# Patient Record
Sex: FEMALE | Race: WHITE | NOT HISPANIC OR LATINO | URBAN - METROPOLITAN AREA
[De-identification: names, ages, dates, MRNs, and addresses within clinical notes are randomized per-mention and may not be internally consistent; named-entity substitution may affect disease eponyms.]

---

## 2017-07-02 ENCOUNTER — EMERGENCY (EMERGENCY)
Facility: HOSPITAL | Age: 22
LOS: 1 days | Discharge: HOME | End: 2017-07-02
Attending: INTERNAL MEDICINE
Payer: COMMERCIAL

## 2017-07-02 ENCOUNTER — TRANSCRIPTION ENCOUNTER (OUTPATIENT)
Age: 22
End: 2017-07-02

## 2017-07-02 VITALS
DIASTOLIC BLOOD PRESSURE: 66 MMHG | TEMPERATURE: 97 F | HEART RATE: 111 BPM | RESPIRATION RATE: 18 BRPM | SYSTOLIC BLOOD PRESSURE: 115 MMHG | HEIGHT: 61 IN | OXYGEN SATURATION: 100 % | WEIGHT: 136.91 LBS

## 2017-07-02 VITALS
RESPIRATION RATE: 18 BRPM | HEART RATE: 108 BPM | DIASTOLIC BLOOD PRESSURE: 72 MMHG | TEMPERATURE: 98 F | SYSTOLIC BLOOD PRESSURE: 107 MMHG | OXYGEN SATURATION: 98 %

## 2017-07-02 PROCEDURE — 96375 TX/PRO/DX INJ NEW DRUG ADDON: CPT

## 2017-07-02 PROCEDURE — 99284 EMERGENCY DEPT VISIT MOD MDM: CPT | Mod: 25

## 2017-07-02 PROCEDURE — 99284 EMERGENCY DEPT VISIT MOD MDM: CPT

## 2017-07-02 PROCEDURE — 96374 THER/PROPH/DIAG INJ IV PUSH: CPT

## 2017-07-02 RX ORDER — SODIUM CHLORIDE 9 MG/ML
1000 INJECTION INTRAMUSCULAR; INTRAVENOUS; SUBCUTANEOUS ONCE
Qty: 0 | Refills: 0 | Status: COMPLETED | OUTPATIENT
Start: 2017-07-02 | End: 2017-07-02

## 2017-07-02 RX ORDER — KETOROLAC TROMETHAMINE 30 MG/ML
30 SYRINGE (ML) INJECTION ONCE
Qty: 0 | Refills: 0 | Status: DISCONTINUED | OUTPATIENT
Start: 2017-07-02 | End: 2017-07-02

## 2017-07-02 RX ORDER — ONDANSETRON 8 MG/1
4 TABLET, FILM COATED ORAL ONCE
Qty: 0 | Refills: 0 | Status: COMPLETED | OUTPATIENT
Start: 2017-07-02 | End: 2017-07-02

## 2017-07-02 RX ADMIN — SODIUM CHLORIDE 2000 MILLILITER(S): 9 INJECTION INTRAMUSCULAR; INTRAVENOUS; SUBCUTANEOUS at 16:20

## 2017-07-02 RX ADMIN — ONDANSETRON 4 MILLIGRAM(S): 8 TABLET, FILM COATED ORAL at 16:20

## 2017-07-02 RX ADMIN — Medication 30 MILLIGRAM(S): at 16:28

## 2017-07-02 NOTE — ED ADULT NURSE REASSESSMENT NOTE - REASSESS COMMUNICATION
ED physician notified/aware and instructed to d/c after fluid infusion is complete.  Pt verbalized understanding.

## 2017-07-02 NOTE — ED ADULT NURSE NOTE - CHPI ED SYMPTOMS NEG
no headache/no discoloration/no weakness/no dry mucous membranes/no vomiting/no loss of consciousness/no blurred vision/no change in level of consciousness/no tingling/no dizziness/no congestion/no pulling at ears/no shortness of breath/no hematuria/no dark urine/no seizure/no abdominal distension/no numbness/no flushing/no diarrhea/no chest tightness/no irritability/no incontinent of urine/no disorientation/no difficulty breathing/no hypotension/no itching/no sleeping issues/no edema/no fatigue/no fever/no fussiness/no lethargy

## 2017-07-02 NOTE — ED PROVIDER NOTE - MEDICAL DECISION MAKING DETAILS
sunburned yesterday..n/vom but was still trying to eat??...in er given iv fluid,zofran and toradol...advised aloe vera with lido at home,etc sunburned yesterday..n/vom but was still trying to eat??...in er given iv fluid,zofran and toradol...advised aloe vera with lido at home,etc..much improvedpost er rx.

## 2017-07-02 NOTE — ED ADULT NURSE NOTE - OBJECTIVE STATEMENT
Pt to the ED for N/V, diffuse sunburn with associated pain, and shivering since yesterday without fever. Pt visited an Urgent Care today where she was advised that she experienced a heat stroke(!) and that she should go to the ED. Pt states that she went to the beach yesterday and did not wear sun screen. After she returned home, pt took a shower and began to experience the sx then. Pt denies any LOC, fever, or any other complaints.

## 2017-07-02 NOTE — ED ADULT NURSE REASSESSMENT NOTE - NS ED NURSE REASSESS COMMENT FT1
pt unable to urinate at this time; states she is not pregnant.  MD aware and approves administering Toradol.

## 2017-07-02 NOTE — ED PROVIDER NOTE - OBJECTIVE STATEMENT
21 y/o F presents to the ED for N/V, diffuse sun burn with associated pain, and tremors since yesterday. Pt visited an [facility] today where she was advised that she experienced a heat stroke and that she should come to the ED. Pt states that she went to the beach yesterday and did not wear sun screen. After she returned home, pt took a shower and began to experience the sx then. Pt denies any LOC, fever, or any other complaints. 21 y/o F presents to the ED for N/V, diffuse sunburn with associated pain, and shivering since yesterday without fever. Pt visited an [facility] today where she was advised that she experienced a heat stroke(!) and that she should go to the ED. Pt states that she went to the beach yesterday and did not wear sun screen. After she returned home, pt took a shower and began to experience the sx then. Pt denies any LOC, fever, or any other complaints.

## 2018-09-22 ENCOUNTER — TRANSCRIPTION ENCOUNTER (OUTPATIENT)
Age: 23
End: 2018-09-22

## 2018-12-28 ENCOUNTER — TRANSCRIPTION ENCOUNTER (OUTPATIENT)
Age: 23
End: 2018-12-28

## 2019-02-18 ENCOUNTER — TRANSCRIPTION ENCOUNTER (OUTPATIENT)
Age: 24
End: 2019-02-18

## 2019-03-19 ENCOUNTER — TRANSCRIPTION ENCOUNTER (OUTPATIENT)
Age: 24
End: 2019-03-19

## 2019-04-18 ENCOUNTER — TRANSCRIPTION ENCOUNTER (OUTPATIENT)
Age: 24
End: 2019-04-18

## 2019-07-08 ENCOUNTER — EMERGENCY (EMERGENCY)
Facility: HOSPITAL | Age: 24
LOS: 1 days | Discharge: HOME | End: 2019-07-08
Attending: EMERGENCY MEDICINE | Admitting: EMERGENCY MEDICINE
Payer: COMMERCIAL

## 2019-07-08 VITALS
HEART RATE: 111 BPM | OXYGEN SATURATION: 99 % | RESPIRATION RATE: 18 BRPM | DIASTOLIC BLOOD PRESSURE: 73 MMHG | SYSTOLIC BLOOD PRESSURE: 125 MMHG

## 2019-07-08 VITALS
HEART RATE: 111 BPM | TEMPERATURE: 99 F | RESPIRATION RATE: 22 BRPM | DIASTOLIC BLOOD PRESSURE: 70 MMHG | SYSTOLIC BLOOD PRESSURE: 133 MMHG | OXYGEN SATURATION: 99 % | WEIGHT: 145.06 LBS | HEIGHT: 61 IN

## 2019-07-08 PROBLEM — J45.909 UNSPECIFIED ASTHMA, UNCOMPLICATED: Chronic | Status: ACTIVE | Noted: 2017-07-02

## 2019-07-08 PROBLEM — F41.9 ANXIETY DISORDER, UNSPECIFIED: Chronic | Status: ACTIVE | Noted: 2017-07-02

## 2019-07-08 PROCEDURE — 94640 AIRWAY INHALATION TREATMENT: CPT

## 2019-07-08 PROCEDURE — 99283 EMERGENCY DEPT VISIT LOW MDM: CPT | Mod: 25

## 2019-07-08 PROCEDURE — 90471 IMMUNIZATION ADMIN: CPT

## 2019-07-08 PROCEDURE — 99283 EMERGENCY DEPT VISIT LOW MDM: CPT

## 2019-07-08 RX ORDER — ESCITALOPRAM OXALATE 10 MG/1
1 TABLET, FILM COATED ORAL
Qty: 0 | Refills: 0 | DISCHARGE

## 2019-07-08 RX ORDER — IPRATROPIUM/ALBUTEROL SULFATE 18-103MCG
3 AEROSOL WITH ADAPTER (GRAM) INHALATION ONCE
Refills: 0 | Status: COMPLETED | OUTPATIENT
Start: 2019-07-08 | End: 2019-07-08

## 2019-07-08 RX ORDER — CLONAZEPAM 1 MG
0 TABLET ORAL
Qty: 0 | Refills: 0 | DISCHARGE

## 2019-07-08 RX ORDER — SERTRALINE 25 MG/1
0 TABLET, FILM COATED ORAL
Qty: 0 | Refills: 0 | DISCHARGE

## 2019-07-08 RX ORDER — ALBUTEROL 90 UG/1
2 AEROSOL, METERED ORAL
Qty: 0 | Refills: 0 | DISCHARGE

## 2019-07-08 RX ADMIN — Medication 60 MILLIGRAM(S): at 01:16

## 2019-07-08 RX ADMIN — Medication 3 MILLILITER(S): at 01:00

## 2019-07-08 NOTE — ED ADULT NURSE NOTE - OBJECTIVE STATEMENT
Pt states she was laying down about 30 minutes ago when she felt a heaviness on her chest and began coughing. Pt was unable to control her cough with her inhaler. Pt denies any fever, has audible wheezing while sitting on chair. Pt denies pain at this time but continues to report chest heaviness. Pt with reactive cough.

## 2019-07-08 NOTE — ED ADULT TRIAGE NOTE - CHIEF COMPLAINT QUOTE
CERTIFICATE OF RETURN TO WORK    December 16, 2017      Regarding:   Parveen Bain  [address]  [facility] [id number]                      This is to certify that Parveen Bain has been under my care on 12/16/2017 and can return to regular work on  12/17/17.            REMARKS:         SIGNATURE:___________________________________________,   12/16/2017      MD Howard Parkinson MD  [address] [facility] [address]  [phone]   "I'm having an asthma attack"

## 2019-07-08 NOTE — ED PROVIDER NOTE - OBJECTIVE STATEMENT
Patient c/o chest tightness and SOB this evening. Feels like her asthma. Some coughing, no fever, no sore throat, no runny nose. Last steroid use a few months ago

## 2019-07-08 NOTE — ED ADULT NURSE NOTE - NSIMPLEMENTINTERV_GEN_ALL_ED
Implemented All Universal Safety Interventions:  [place] to call system. Call bell, personal items and telephone within reach. Instruct patient to call for assistance. Room bathroom lighting operational. Non-slip footwear when patient is off stretcher. Physically safe environment: no spills, clutter or unnecessary equipment. Stretcher in lowest position, wheels locked, appropriate side rails in place.

## 2019-07-15 NOTE — ED PROVIDER NOTE - CHIEF COMPLAINT
Pt resting in bed with eyes closed, breathing even and unlabored, no s/s of distress noted, sitter at bedside   The patient is a 22y Female complaining of heat cramps/exhaustion/stroke.

## 2019-08-05 ENCOUNTER — TRANSCRIPTION ENCOUNTER (OUTPATIENT)
Age: 24
End: 2019-08-05

## 2019-08-10 ENCOUNTER — TRANSCRIPTION ENCOUNTER (OUTPATIENT)
Age: 24
End: 2019-08-10

## 2019-09-11 ENCOUNTER — TRANSCRIPTION ENCOUNTER (OUTPATIENT)
Age: 24
End: 2019-09-11

## 2019-09-21 ENCOUNTER — TRANSCRIPTION ENCOUNTER (OUTPATIENT)
Age: 24
End: 2019-09-21

## 2019-09-23 ENCOUNTER — TRANSCRIPTION ENCOUNTER (OUTPATIENT)
Age: 24
End: 2019-09-23

## 2019-11-12 ENCOUNTER — TRANSCRIPTION ENCOUNTER (OUTPATIENT)
Age: 24
End: 2019-11-12

## 2019-12-28 ENCOUNTER — TRANSCRIPTION ENCOUNTER (OUTPATIENT)
Age: 24
End: 2019-12-28

## 2020-02-17 ENCOUNTER — TRANSCRIPTION ENCOUNTER (OUTPATIENT)
Age: 25
End: 2020-02-17

## 2020-05-29 ENCOUNTER — TRANSCRIPTION ENCOUNTER (OUTPATIENT)
Age: 25
End: 2020-05-29

## 2020-10-26 ENCOUNTER — TRANSCRIPTION ENCOUNTER (OUTPATIENT)
Age: 25
End: 2020-10-26

## 2020-12-02 ENCOUNTER — TRANSCRIPTION ENCOUNTER (OUTPATIENT)
Age: 25
End: 2020-12-02

## 2020-12-23 ENCOUNTER — TRANSCRIPTION ENCOUNTER (OUTPATIENT)
Age: 25
End: 2020-12-23

## 2020-12-30 ENCOUNTER — TRANSCRIPTION ENCOUNTER (OUTPATIENT)
Age: 25
End: 2020-12-30

## 2021-01-15 ENCOUNTER — APPOINTMENT (OUTPATIENT)
Dept: ORTHOPEDIC SURGERY | Facility: CLINIC | Age: 26
End: 2021-01-15
Payer: COMMERCIAL

## 2021-01-15 VITALS
HEIGHT: 61 IN | HEART RATE: 99 BPM | SYSTOLIC BLOOD PRESSURE: 122 MMHG | DIASTOLIC BLOOD PRESSURE: 84 MMHG | WEIGHT: 147 LBS | BODY MASS INDEX: 27.75 KG/M2

## 2021-01-15 VITALS — TEMPERATURE: 97.5 F

## 2021-01-15 DIAGNOSIS — Z78.9 OTHER SPECIFIED HEALTH STATUS: ICD-10-CM

## 2021-01-15 PROBLEM — Z00.00 ENCOUNTER FOR PREVENTIVE HEALTH EXAMINATION: Status: ACTIVE | Noted: 2021-01-15

## 2021-01-15 PROCEDURE — 72110 X-RAY EXAM L-2 SPINE 4/>VWS: CPT

## 2021-01-15 PROCEDURE — 72220 X-RAY EXAM SACRUM TAILBONE: CPT

## 2021-01-15 PROCEDURE — 99204 OFFICE O/P NEW MOD 45 MIN: CPT

## 2021-01-15 PROCEDURE — 99072 ADDL SUPL MATRL&STAF TM PHE: CPT

## 2021-01-15 NOTE — ASSESSMENT
[FreeTextEntry1] : This is a 25-year-old female that is here today for evaluation of her coccyx pain.  She has no red flag symptoms on exam.  She has no radicular type symptoms.  She is fully strong and without any areas of numbness or tingling.  I think she will do well with conservative measures.  I encouraged her to wear her doughnut around the clock when sitting.  She should continue her activities of daily living and use pain as a guide to refrain from doing any activities that exacerbate her pain.  I will see her back in 6 weeks for repeat clinical evaluation.

## 2021-01-15 NOTE — PHYSICAL EXAM
[de-identified] : Lumbar Physical Exam\par \par Gait -normal\par \par Station -normal\par \par Sagittal balance -normal\par \par Compensatory mechanism? -None\par \par Heel walk -normal\par \par Toe walk -normal\par \par Reflexes\par Patellar -normal\par Gastroc -normal\par Clonus -no\par \par Hip Exam -normal with passive and active range of motion\par \par Straight leg raise -positive on the right\par \par Pulses -2+ DP/PT\par \par Range of motion -globally reduced\par \par Sensation \par Sensation intact to light touch in L1, L2, L3, L4, L5 and S1 dermatomes bilaterally\par \par Motor\par 	IP	Quad	HS	TA	Gastroc	EHL\par Right	5/5	5/5	5/5	5/5	5/5	5/5\par Left	5/5	5/5	5/5	5/5	5/5	5/5\par \par  [de-identified] : Lumbar radiographs\par Lumbar lordosis maintained\par No significant disc height loss\par No significant facet arthropathy\par \par Sacrum x-rays\par Coccyx appears in appropriate alignment\par No acute complication\par

## 2021-01-15 NOTE — HISTORY OF PRESENT ILLNESS
[de-identified] : This is a 25-year-old female here today for evaluation of her low back pain/buttock/coccyx pain.  The symptoms began in mid December when she fell on the ice/stairs at home.  She had sudden onset pain in her coccyx.  She was evaluated in urgent care and was given muscle relaxers and told to follow-up.  Currently she has significant pain in her coccyx which is debilitating and interfering with her ability to perform her activities of daily living.  She denies any radiating pain.  She denies any focal areas of weakness.  She works as a nurse at [employer] and unfortunately her symptoms have persisted despite time and rest.

## 2021-03-04 ENCOUNTER — APPOINTMENT (OUTPATIENT)
Dept: ORTHOPEDIC SURGERY | Facility: CLINIC | Age: 26
End: 2021-03-04
Payer: COMMERCIAL

## 2021-03-04 DIAGNOSIS — M53.3 SACROCOCCYGEAL DISORDERS, NOT ELSEWHERE CLASSIFIED: ICD-10-CM

## 2021-03-04 DIAGNOSIS — M54.5 LOW BACK PAIN: ICD-10-CM

## 2021-03-04 PROCEDURE — 99214 OFFICE O/P EST MOD 30 MIN: CPT

## 2021-03-04 PROCEDURE — 99072 ADDL SUPL MATRL&STAF TM PHE: CPT

## 2021-03-04 NOTE — PHYSICAL EXAM
[de-identified] : Lumbar Physical Exam\par \par Gait -normal\par \par Station -normal\par \par Sagittal balance -normal\par \par Compensatory mechanism? -None\par \par Heel walk -normal\par \par Toe walk -normal\par \par Reflexes\par Patellar -normal\par Gastroc -normal\par Clonus -no\par \par Hip Exam -normal with passive and active range of motion\par \par Straight leg raise -negative\par \par Pulses -2+ DP/PT\par \par Range of motion -normal\par \par Sensation \par Sensation intact to light touch in L1, L2, L3, L4, L5 and S1 dermatomes bilaterally\par \par Motor\par 	IP	Quad	HS	TA	Gastroc	EHL\par Right	5/5	5/5	5/5	5/5	5/5	5/5\par Left	5/5	5/5	5/5	5/5	5/5	5/5\par \par Benign exam

## 2021-03-04 NOTE — ASSESSMENT
[FreeTextEntry1] : This is a 25-year-old female that originally presented to me with significant and severe coccyx and low back pain.  Since her last visit she has had substantial improvement in her symptoms.  Overall she is very pleased with her progress.  She would like to go back to full duties at work which I think is absolutely reasonable.  She can take Tylenol and ibuprofen as needed for pain relief.  I will see her on an as-needed basis.  Please note that over 30 minutes of time spent in preop visit preparation, in person visit and post visit documentation for this patient.

## 2021-03-04 NOTE — HISTORY OF PRESENT ILLNESS
[de-identified] : This is a 25-year-old female here today for evaluation of her low back pain/buttock/coccyx pain.  The symptoms began in mid December when she fell on the ice/stairs at home.  She had sudden onset pain in her coccyx.  She was evaluated in urgent care and was given muscle relaxers and told to follow-up.  Currently she has significant pain in her coccyx which is debilitating and interfering with her ability to perform her activities of daily living.  She denies any radiating pain.  She denies any focal areas of weakness.  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  Overall she is pleased with her progress.  She denies any significant coccyx pain right now.  She is gone on to do her activities daily without significant issue.

## 2021-05-08 ENCOUNTER — TRANSCRIPTION ENCOUNTER (OUTPATIENT)
Age: 26
End: 2021-05-08

## 2021-06-03 ENCOUNTER — TRANSCRIPTION ENCOUNTER (OUTPATIENT)
Age: 26
End: 2021-06-03

## 2021-06-16 NOTE — ED ADULT NURSE REASSESSMENT NOTE - CONDITION
INR 2.0 INR stable. Discussed continuing management of dose of Warfarin and returning in one month . No changes to diet needed at this time. Continue moderate intake of Vitamin K and call if increase bruising or unexplained bleeding. Call with medication changes or upcoming procedures.     improved

## 2021-07-14 ENCOUNTER — EMERGENCY (EMERGENCY)
Facility: HOSPITAL | Age: 26
LOS: 1 days | Discharge: LEFT BEFORE DISCHARGE | End: 2021-07-14
Admitting: EMERGENCY MEDICINE
Payer: COMMERCIAL

## 2021-07-14 VITALS
OXYGEN SATURATION: 100 % | SYSTOLIC BLOOD PRESSURE: 125 MMHG | HEART RATE: 97 BPM | TEMPERATURE: 99 F | RESPIRATION RATE: 15 BRPM | DIASTOLIC BLOOD PRESSURE: 76 MMHG | HEIGHT: 61 IN

## 2021-07-14 PROCEDURE — L9991: CPT

## 2021-07-14 NOTE — ED ADULT TRIAGE NOTE - CHIEF COMPLAINT QUOTE
Pt c/o diarrhea since Sunday with nausea/vomiting since yesterday. Denies any blood in  stool or emesis.  Pt endorses abdominal cramping.  Denies any sick contacts.  PMHx:   asthma, anxiety, depression

## 2021-07-19 ENCOUNTER — TRANSCRIPTION ENCOUNTER (OUTPATIENT)
Age: 26
End: 2021-07-19

## 2021-07-20 ENCOUNTER — TRANSCRIPTION ENCOUNTER (OUTPATIENT)
Age: 26
End: 2021-07-20

## 2021-10-13 ENCOUNTER — TRANSCRIPTION ENCOUNTER (OUTPATIENT)
Age: 26
End: 2021-10-13

## 2022-01-03 ENCOUNTER — TRANSCRIPTION ENCOUNTER (OUTPATIENT)
Age: 27
End: 2022-01-03

## 2022-06-02 ENCOUNTER — NON-APPOINTMENT (OUTPATIENT)
Age: 27
End: 2022-06-02

## 2022-06-21 ENCOUNTER — NON-APPOINTMENT (OUTPATIENT)
Age: 27
End: 2022-06-21

## 2022-06-30 ENCOUNTER — APPOINTMENT (OUTPATIENT)
Dept: ORTHOPEDIC SURGERY | Facility: CLINIC | Age: 27
End: 2022-06-30

## 2022-07-06 ENCOUNTER — APPOINTMENT (OUTPATIENT)
Dept: PHYSICAL MEDICINE AND REHAB | Facility: CLINIC | Age: 27
End: 2022-07-06

## 2022-07-06 VITALS — SYSTOLIC BLOOD PRESSURE: 92 MMHG | HEART RATE: 101 BPM | OXYGEN SATURATION: 100 % | DIASTOLIC BLOOD PRESSURE: 62 MMHG

## 2022-07-06 DIAGNOSIS — Z86.59 PERSONAL HISTORY OF OTHER MENTAL AND BEHAVIORAL DISORDERS: ICD-10-CM

## 2022-07-06 DIAGNOSIS — Z87.09 PERSONAL HISTORY OF OTHER DISEASES OF THE RESPIRATORY SYSTEM: ICD-10-CM

## 2022-07-06 PROCEDURE — 99203 OFFICE O/P NEW LOW 30 MIN: CPT

## 2022-07-06 RX ORDER — METHYLPREDNISOLONE 4 MG/1
4 TABLET ORAL
Qty: 1 | Refills: 0 | Status: ACTIVE | COMMUNITY
Start: 2022-07-06 | End: 1900-01-01

## 2022-07-06 RX ORDER — CYCLOBENZAPRINE HYDROCHLORIDE 5 MG/1
5 TABLET, FILM COATED ORAL
Qty: 20 | Refills: 0 | Status: ACTIVE | COMMUNITY
Start: 2022-07-06 | End: 1900-01-01

## 2022-07-06 NOTE — REVIEW OF SYSTEMS
[Fever] : no fever [Chills] : no chills [Chest Pain] : no chest pain [Palpitations] : no palpitations [Shortness Of Breath] : no shortness of breath [Wheezing] : no wheezing [Cough] : no cough [Headache] : no headaches [Anxiety] : no anxiety

## 2022-07-06 NOTE — PHYSICAL EXAM
[5] : S1 ankle flexors 5/5 [Straight-Leg Raise Test - Left] : straight leg raise of the left leg was positive [Full Except As Noted] : Full except as noted: [Normal Except As Noted] : Normal except as noted: [Able to toe walk] : the patient was able to toe walk [Able to heel walk] : the patient was able to heel walk [Intact] : all sensory within normal limits bilaterally [Normal] : Oriented to person, place, and time, insight and judgement were intact and the affect was normal [de-identified] : Alert, appears uncomfortable in sitting position.  [de-identified] : no respiratory distress  [de-identified] : warm, well perfused  [de-identified] : limited lumbar spine ROM, pain with flexion, no spinal tenderness, +trigger points paraspinal muscles  [de-identified] : strength 5/5, +SLR, gait normal [FreeTextEntry1] : heel and toe walking intact [Straight-Leg Raise Test - Right] : straight leg raise  of the right leg was negative [Restricted] : was not restricted [Pain] : was painless

## 2022-07-06 NOTE — HISTORY OF PRESENT ILLNESS
[Back] : back [___ wks] : [unfilled] week(s) ago [Date: ___] : ~His/Her~ pain is a result of an accident or injury occuring on [unfilled] [3] : a minimum pain level of 3/10 [5] : a maximum pain level of 5/10 [Shooting] : shooting [Left] : left [Buttocks] : buttocks [Thigh] : thigh [Knee] : knee [Sitting] : sitting [Standing] : standing [Bending] : bending [Lifting] : lifting [Coughing] : coughing [Sneezing] : sneezing [Medications] : medications [Heat] : heat [Ice] : ice [Worsened] : have worsened [Prior Episode?] : The patient has not had prior episodes [FreeTextEntry1] : Fell off of a chair

## 2022-07-06 NOTE — ASSESSMENT
[FreeTextEntry1] : 28 yo female presenting with lower back pain for 6 weeks accompanied by acute shooting pain down the left buttock, thigh, and knee with positive left straight leg raise, pain with coughing/sneezing. Most likely primary diagnosis is acute lumbar radiculopathy with bilateral lower back pain\par

## 2022-07-06 NOTE — END OF VISIT
[] : A student assisted with documenting this visit. I have reviewed and verified all information documented by the student, and made modifications to such information, when appropriate. [FreeTextEntry3] : Patient will start PT, medrol taper with muscle relaxant, tylenol as needed for pain, will follow up in two weeks, patient to call office with any worsening pain, new weakness, numbness, may need MRI/consider epidural injection if no relief with conservative management.

## 2022-07-20 ENCOUNTER — APPOINTMENT (OUTPATIENT)
Dept: PHYSICAL MEDICINE AND REHAB | Facility: CLINIC | Age: 27
End: 2022-07-20

## 2022-07-20 VITALS — DIASTOLIC BLOOD PRESSURE: 80 MMHG | HEART RATE: 95 BPM | OXYGEN SATURATION: 100 % | SYSTOLIC BLOOD PRESSURE: 108 MMHG

## 2022-07-20 DIAGNOSIS — M54.16 RADICULOPATHY, LUMBAR REGION: ICD-10-CM

## 2022-07-20 PROCEDURE — 99213 OFFICE O/P EST LOW 20 MIN: CPT | Mod: 25

## 2022-07-20 PROCEDURE — 20552 NJX 1/MLT TRIGGER POINT 1/2: CPT

## 2022-07-20 NOTE — PHYSICAL EXAM
[Normal] : Oriented to person, place, and time, insight and judgement were intact and the affect was normal [de-identified] : no respiratory distress  [de-identified] : warm, well perfused  [de-identified] : limited lumbar spine ROM, pain with flexion, no spinal tenderness, +trigger points paraspinal muscles, on right  [de-identified] : strength 5/5, +SLR, gait normal

## 2022-07-20 NOTE — ASSESSMENT
[FreeTextEntry1] : Patient is a 27 year old woman with lower back pain, lumbar radiculopathy\par improved with medrol dose pack\par plans to start PT, daily stretching\par continue lidocaine patch, heat\par trigger point injection was done today \par follow up as needed

## 2022-07-20 NOTE — PROCEDURE
[de-identified] : Patient consented to trigger point injections, using 25 gauge, 1.5 inch needle, 2% lidocaine, x 0.6 cc, to right QL muscle, patient tolerated the procedure well, can use heat/take tylenol as needed for pain.\par

## 2022-07-20 NOTE — HISTORY OF PRESENT ILLNESS
[FreeTextEntry1] : 27 year old woman presents for follow up. She was initially seen two weeks ago with lower back pain, radiating to left lower extremity. Patient today reports she noticed improvement with prednisone taper, now taking naproxen as needed for pain, using lidocaine patches, tried flexeril at night, does not feel it helped. She continues to have lower back discomfort, across lower back, no radiation. Pain is 5-7/10, worse with bending. Feels she has limited ROM. Has been going to work, limits her lifting/bending. Denies weakness.

## 2022-07-22 ENCOUNTER — NON-APPOINTMENT (OUTPATIENT)
Age: 27
End: 2022-07-22

## 2022-09-01 ENCOUNTER — NON-APPOINTMENT (OUTPATIENT)
Age: 27
End: 2022-09-01

## 2023-01-10 ENCOUNTER — NON-APPOINTMENT (OUTPATIENT)
Age: 28
End: 2023-01-10

## 2023-02-14 ENCOUNTER — NON-APPOINTMENT (OUTPATIENT)
Age: 28
End: 2023-02-14

## 2023-06-11 ENCOUNTER — NON-APPOINTMENT (OUTPATIENT)
Age: 28
End: 2023-06-11

## 2023-07-05 ENCOUNTER — NON-APPOINTMENT (OUTPATIENT)
Age: 28
End: 2023-07-05

## 2023-10-31 ENCOUNTER — NON-APPOINTMENT (OUTPATIENT)
Age: 28
End: 2023-10-31

## 2024-05-17 NOTE — ED ADULT TRIAGE NOTE - SPO2 (%)
[Very Good] : ~his/her~  mood as very good [Yes] : Yes [2 - 3 times a week (3 pts)] : 2 - 3  times a week (3 points) [1 or 2 (0 pts)] : 1 or 2 (0 points) [Never (0 pts)] : Never (0 points) [No falls in past year] : Patient reported no falls in the past year [0] : 2) Feeling down, depressed, or hopeless: Not at all (0) [PHQ-2 Negative - No further assessment needed] : PHQ-2 Negative - No further assessment needed [Audit-CScore] : 2 [de-identified] : Starting again [de-identified] : Fairly healthy [[id number]] : 0 [HIV test declined] : HIV test declined [Hepatitis C test declined] : Hepatitis C test declined [Change in mental status noted] : No change in mental status noted [Language] : denies difficulty with language [Behavior] : denies difficulty with behavior [Learning/Retaining New Information] : denies difficulty learning/retaining new information [Handling Complex Tasks] : denies difficulty handling complex tasks [Reasoning] : denies difficulty with reasoning [Spatial Ability and Orientation] : denies difficulty with spatial ability and orientation [None] : None [Alone] : lives alone 100 [Employed] : employed [College] : College [Sexually Active] : not sexually active [High Risk Behavior] : no high risk behavior [Feels Safe at Home] : Feels safe at home [Fully functional (bathing, dressing, toileting, transferring, walking, feeding)] : Fully functional (bathing, dressing, toileting, transferring, walking, feeding) [Fully functional (using the telephone, shopping, preparing meals, housekeeping, doing laundry, using] : Fully functional and needs no help or supervision to perform IADLs (using the telephone, shopping, preparing meals, housekeeping, doing laundry, using transportation, managing medications and managing finances) [Reports changes in hearing] : Reports no changes in hearing [Reports changes in vision] : Reports no changes in vision [Reports normal functional visual acuity (ie: able to read med bottle)] : Reports normal functional visual acuity [Reports changes in dental health] : Reports no changes in dental health [Smoke Detector] : smoke detector [Carbon Monoxide Detector] : carbon monoxide detector [Guns at Home] : no guns at home [Safety elements used in home] : safety elements used in home [Seat Belt] :  uses seat belt [Sunscreen] : uses sunscreen [Travel to Developing Areas] : does not  travel to developing areas [TB Exposure] : is not being exposed to tuberculosis [Caregiver Concerns] : does not have caregiver concerns

## 2024-05-30 ENCOUNTER — NON-APPOINTMENT (OUTPATIENT)
Age: 29
End: 2024-05-30

## 2024-10-23 ENCOUNTER — APPOINTMENT (OUTPATIENT)
Dept: FAMILY MEDICINE | Facility: CLINIC | Age: 29
End: 2024-10-23